# Patient Record
Sex: MALE | Race: BLACK OR AFRICAN AMERICAN | URBAN - METROPOLITAN AREA
[De-identification: names, ages, dates, MRNs, and addresses within clinical notes are randomized per-mention and may not be internally consistent; named-entity substitution may affect disease eponyms.]

---

## 2017-03-21 PROBLEM — 197091007 DIVERTICULAR DISEASE OF BOTH SMALL AND LARGE INTESTINE WITHOUT PERFORATION OR ABSCESS: Status: ACTIVE | Noted: 2017-03-21

## 2021-06-14 ENCOUNTER — TELEPHONE ENCOUNTER (OUTPATIENT)
Dept: URBAN - METROPOLITAN AREA CLINIC 35 | Facility: CLINIC | Age: 51
End: 2021-06-14

## 2022-01-14 ENCOUNTER — TELEPHONE ENCOUNTER (OUTPATIENT)
Dept: URBAN - METROPOLITAN AREA CLINIC 36 | Facility: CLINIC | Age: 52
End: 2022-01-14

## 2022-01-18 ENCOUNTER — OFFICE VISIT (OUTPATIENT)
Dept: URBAN - METROPOLITAN AREA CLINIC 31 | Facility: CLINIC | Age: 52
End: 2022-01-18

## 2022-01-18 RX ORDER — NYSTATIN AND TRIAMCINOLONE ACETONIDE 100000; 1 [USP'U]/G; MG/G
1 APPLICATION TO AFFECTED AREA CREAM TOPICAL TWICE A DAY
Status: ACTIVE | COMMUNITY

## 2022-01-18 RX ORDER — THIAMINE HCL 100 MG
1 CAPSULE WITH A MEAL TABLET ORAL ONCE A DAY
Status: ACTIVE | COMMUNITY

## 2022-01-18 RX ORDER — VITAMIN D 25 MCG
1 TABLET TAB ORAL ONCE A DAY
Status: ACTIVE | COMMUNITY

## 2022-01-18 NOTE — HPI-ABDOMINAL PAIN
Patient presents today for a consultation of abdominal pain with onset?   Located ____ and is described as a ___.  Pain started __ and the patient states that they have ___ episodes per __.  Patient denies/admits any aggravating factors: ___.    Denies/admits any alleviating factors: ____.  Patient has tried ______ (medications) with __ relief of their symptoms.  Patient denies/admits the use of antibiotics within the last (3-6) months. Patient states that they have/have not had previous labs, radiology, or procedures.  The patient denies/admits any hospital/ER visits for their pain. 	  Denies/Admits a family history of colon cancer or diseases/esophageal or gastric cancer or diseases.  Admits having a Colonoscopy in (2017) noting repeat in 5 years. Admits/denies having an EGD (when, by who, and results).

## 2022-06-29 ENCOUNTER — OFFICE VISIT (OUTPATIENT)
Dept: URBAN - METROPOLITAN AREA CLINIC 35 | Facility: CLINIC | Age: 52
End: 2022-06-29
Payer: COMMERCIAL

## 2022-06-29 VITALS
SYSTOLIC BLOOD PRESSURE: 122 MMHG | DIASTOLIC BLOOD PRESSURE: 90 MMHG | OXYGEN SATURATION: 99 % | WEIGHT: 152 LBS | HEIGHT: 68 IN | BODY MASS INDEX: 23.04 KG/M2 | HEART RATE: 60 BPM

## 2022-06-29 DIAGNOSIS — R10.11 RIGHT UPPER QUADRANT ABDOMINAL PAIN: ICD-10-CM

## 2022-06-29 DIAGNOSIS — R14.0 ABDOMINAL BLOATING: ICD-10-CM

## 2022-06-29 DIAGNOSIS — R14.3 FLATULENCE SYMPTOM: ICD-10-CM

## 2022-06-29 DIAGNOSIS — R10.10 PAIN OF UPPER ABDOMEN: ICD-10-CM

## 2022-06-29 PROCEDURE — 99204 OFFICE O/P NEW MOD 45 MIN: CPT | Performed by: PHYSICIAN ASSISTANT

## 2022-06-29 RX ORDER — VITAMIN D 25 MCG
1 TABLET TAB ORAL ONCE A DAY
Status: ACTIVE | COMMUNITY

## 2022-06-29 NOTE — HPI-ABDOMINAL PAIN
52 y/o male patient presents today with abdominal pain, which is located in the upper side which sometimes radiates to the back of the spine.  Pain primarily in RUQ. The duration of symptoms is intermittent. The course of symptoms fluctuates in intensification. The degree of symptoms is moderate to severe.  He notes issues for the past 12 years, and was diagnosed with gastritis in 2014.  Typically the irritation he would experience would take a while to bulid up but now symptoms occur more rapidly.  He states fatty foods do not bother him.  No N/V.  Alcohol, bread, wheat products, caffeine, sugary products and many others does been affecting him and lately he has been much more sensitive to these. Patient did try taking Pepto-Bismol. Patient is currently on an elimination diet.   The relieving factor is none. Patient admits to 1 formed bowel movement per day, with no mucus, melena, or blood in stool.  Colonoscopy in 2017.  Patient admits bloating/gas, indigestion, but denies  changes in bowel habits. Patient admits to associated epigastric pain, but denies nausea, vomiting, fever, chills, dizziness,  dysphagia, globus, sour eructations, early satiety, changes in appetite, or coughing.   Risk factors consist of not DM, not recent antibiotic use, not infectious exposure, no recent travel, not contaminated food and water, and not Crohn's disease.  Patient admits past colonoscopy and EGD in the past.  Patient denies any family history of colonic cancer/disease/polyps.

## 2022-07-05 ENCOUNTER — TELEPHONE ENCOUNTER (OUTPATIENT)
Dept: URBAN - METROPOLITAN AREA CLINIC 6 | Facility: CLINIC | Age: 52
End: 2022-07-05

## 2022-07-29 ENCOUNTER — OFFICE VISIT (OUTPATIENT)
Dept: URBAN - METROPOLITAN AREA SURGERY CENTER 8 | Facility: SURGERY CENTER | Age: 52
End: 2022-07-29

## 2022-08-03 ENCOUNTER — OFFICE VISIT (OUTPATIENT)
Dept: URBAN - METROPOLITAN AREA SURGERY CENTER 8 | Facility: SURGERY CENTER | Age: 52
End: 2022-08-03
Payer: COMMERCIAL

## 2022-08-03 ENCOUNTER — CLAIMS CREATED FROM THE CLAIM WINDOW (OUTPATIENT)
Dept: URBAN - METROPOLITAN AREA CLINIC 4 | Facility: CLINIC | Age: 52
End: 2022-08-03
Payer: COMMERCIAL

## 2022-08-03 DIAGNOSIS — K31.A0 GASTRIC INTESTINAL METAPLASIA, UNSPECIFIED: ICD-10-CM

## 2022-08-03 DIAGNOSIS — K31.89 OTHER DISEASES OF STOMACH AND DUODENUM: ICD-10-CM

## 2022-08-03 DIAGNOSIS — R10.11 ABDOMINAL BURNING SENSATION IN RIGHT UPPER QUADRANT: ICD-10-CM

## 2022-08-03 DIAGNOSIS — K29.60 ADENOPAPILLOMATOSIS GASTRICA: ICD-10-CM

## 2022-08-03 DIAGNOSIS — K31.89 ACQUIRED DEFORMITY OF DUODENUM: ICD-10-CM

## 2022-08-03 DIAGNOSIS — K21.9 ACID REFLUX: ICD-10-CM

## 2022-08-03 DIAGNOSIS — K21.9 GASTRO-ESOPHAGEAL REFLUX DISEASE WITHOUT ESOPHAGITIS: ICD-10-CM

## 2022-08-03 PROCEDURE — 43239 EGD BIOPSY SINGLE/MULTIPLE: CPT | Performed by: INTERNAL MEDICINE

## 2022-08-03 PROCEDURE — 88312 SPECIAL STAINS GROUP 1: CPT | Performed by: PATHOLOGY

## 2022-08-03 PROCEDURE — 88305 TISSUE EXAM BY PATHOLOGIST: CPT | Performed by: PATHOLOGY

## 2022-08-03 PROCEDURE — 88342 IMHCHEM/IMCYTCHM 1ST ANTB: CPT | Performed by: PATHOLOGY

## 2022-08-03 PROCEDURE — G8907 PT DOC NO EVENTS ON DISCHARG: HCPCS | Performed by: INTERNAL MEDICINE

## 2022-08-03 RX ORDER — VITAMIN D 25 MCG
1 TABLET TAB ORAL ONCE A DAY
Status: ACTIVE | COMMUNITY

## 2022-08-22 ENCOUNTER — OFFICE VISIT (OUTPATIENT)
Dept: URBAN - METROPOLITAN AREA CLINIC 35 | Facility: CLINIC | Age: 52
End: 2022-08-22
Payer: COMMERCIAL

## 2022-08-22 VITALS — WEIGHT: 155 LBS | BODY MASS INDEX: 23.49 KG/M2 | HEIGHT: 68 IN

## 2022-08-22 DIAGNOSIS — R10.10 PAIN OF UPPER ABDOMEN: ICD-10-CM

## 2022-08-22 DIAGNOSIS — R14.3 FLATULENCE SYMPTOM: ICD-10-CM

## 2022-08-22 DIAGNOSIS — R14.0 ABDOMINAL BLOATING: ICD-10-CM

## 2022-08-22 DIAGNOSIS — K44.9 HIATAL HERNIA: ICD-10-CM

## 2022-08-22 DIAGNOSIS — R10.11 RIGHT UPPER QUADRANT ABDOMINAL PAIN: ICD-10-CM

## 2022-08-22 DIAGNOSIS — R68.89 ERYTHEMATOUS MUCOSA: ICD-10-CM

## 2022-08-22 PROBLEM — 301717006: Status: ACTIVE | Noted: 2022-08-22

## 2022-08-22 PROBLEM — 162432007 SYMPTOM: GENERALIZED: Status: ACTIVE | Noted: 2022-08-19

## 2022-08-22 PROBLEM — 83132003: Status: ACTIVE | Noted: 2022-08-22

## 2022-08-22 PROBLEM — 84089009 HIATAL HERNIA: Status: ACTIVE | Noted: 2022-08-19

## 2022-08-22 PROBLEM — 116289008: Status: ACTIVE | Noted: 2022-08-22

## 2022-08-22 PROBLEM — 308698004: Status: ACTIVE | Noted: 2022-08-22

## 2022-08-22 PROCEDURE — 99213 OFFICE O/P EST LOW 20 MIN: CPT | Performed by: INTERNAL MEDICINE

## 2022-08-22 RX ORDER — VITAMIN D 25 MCG
1 TABLET TAB ORAL ONCE A DAY
Status: ACTIVE | COMMUNITY

## 2022-08-22 RX ORDER — MULTIVITAMIN
1 TABLET TABLET ORAL ONCE A DAY
Status: ACTIVE | COMMUNITY

## 2022-08-22 NOTE — HPI-BLOATING/GAS
He denies continued episodes of bloating/gas with dietary modifications. Last visit (6/29/2022)  Patient admits to bloating and gas.

## 2022-08-22 NOTE — HPI-ABDOMINAL PAIN
Patient presents today to review his endoscopy results. He denies any complications after his procedure.   Since his procedure he denies any episodes of dysphagia, globus, a change in appetite or bowel habits.   He denies continued episodes of epigastric pain with dietary modifications.  Currently, he reports 1 bowel movements per day without strain. His stools are formed.  Denies melena, blood, mucus, or melena. He admits/denies any rectal pain or pruritus ani.  Last visit (6/29/2022)  50 y/o male patient presents today with abdominal pain, which is located in the upper side which sometimes radiates to the back of the spine.  Pain primarily in RUQ. The duration of symptoms is intermittent. The course of symptoms fluctuates in intensification. The degree of symptoms is moderate to severe.  He notes issues for the past 12 years, and was diagnosed with gastritis in 2014.  Typically, the irritation he would experience would take a while to build up but now symptoms occur more rapidly.  He states fatty foods do not bother him.  No N/V.  Alcohol, bread, wheat products, caffeine, sugary products and many others does been affecting him and lately he has been much more sensitive to these. Patient did try taking Pepto-Bismol. Patient is currently on an elimination diet.   The relieving factor is none. Patient admits to 1 formed bowel movement per day, with no mucus, melena, or blood in stool.  Colonoscopy in 2017.  Patient admits bloating/gas, indigestion, but denies  changes in bowel habits. Patient admits to associated epigastric pain, but denies nausea, vomiting, fever, chills, dizziness,  dysphagia, globus, sour eructations, early satiety, changes in appetite, or coughing.   Risk factors consist of not DM, not recent antibiotic use, not infectious exposure, no recent travel, not contaminated food and water, and not Crohn's disease.  Patient admits past colonoscopy and EGD in the past.  Patient denies any family history of colonic cancer/disease/polyps.

## 2024-04-11 ENCOUNTER — OV EP (OUTPATIENT)
Dept: URBAN - METROPOLITAN AREA CLINIC 33 | Facility: CLINIC | Age: 54
End: 2024-04-11
Payer: COMMERCIAL

## 2024-04-11 VITALS
SYSTOLIC BLOOD PRESSURE: 120 MMHG | WEIGHT: 153.4 LBS | BODY MASS INDEX: 23.25 KG/M2 | OXYGEN SATURATION: 98 % | HEIGHT: 68 IN | HEART RATE: 74 BPM | DIASTOLIC BLOOD PRESSURE: 84 MMHG

## 2024-04-11 DIAGNOSIS — R10.10 PAIN OF UPPER ABDOMEN: ICD-10-CM

## 2024-04-11 DIAGNOSIS — K44.9 HIATAL HERNIA: ICD-10-CM

## 2024-04-11 DIAGNOSIS — R14.0 ABDOMINAL BLOATING: ICD-10-CM

## 2024-04-11 DIAGNOSIS — R14.3 FLATULENCE SYMPTOM: ICD-10-CM

## 2024-04-11 DIAGNOSIS — R68.89 ERYTHEMATOUS MUCOSA: ICD-10-CM

## 2024-04-11 PROCEDURE — 99213 OFFICE O/P EST LOW 20 MIN: CPT | Performed by: INTERNAL MEDICINE

## 2024-04-11 RX ORDER — MULTIVITAMIN
1 TABLET TABLET ORAL ONCE A DAY
Status: ACTIVE | COMMUNITY

## 2024-04-11 RX ORDER — FAMOTIDINE 10 MG/1
1 TABLET AS NEEDED TABLET ORAL TWICE A DAY
Status: ACTIVE | COMMUNITY

## 2024-04-11 RX ORDER — VITAMIN D 25 MCG
1 TABLET TAB ORAL ONCE A DAY
Status: ACTIVE | COMMUNITY

## 2024-04-11 NOTE — HPI-ABDOMINAL PAIN
Patient presents today for recent symptoms of abdominal pain. Onset strarted mid January 2024. Patient states the pain is located in the epigastric region and describes the pain as an "hunger pain" . The pain is worse on an empty stomach and before a bowel movement.  He states he has tried Famotidine 20mg prn without any relief of his symptoms. Patient mentioned he is not sure if the Famotidine is helping with his symptoms. Patient states his symptoms is not present all the time, but occurs when consuming meals. He denies any associated symptoms of nausea w/vomiting, indigestion, heartburn, or excessive belching. Patient denies having any recent labs, imaging, or ER visit. His symptom are better after eating salads   Last visit (08/22/2022) Patient presents today to review his endoscopy results. He denies any complications after his procedure.   Since his procedure he denies any episodes of dysphagia, globus, a change in appetite or bowel habits.   He denies continued episodes of epigastric pain with dietary modifications.  Currently, he reports 1 bowel movements per day without strain. His stools are formed.  Denies melena, blood, mucus, or melena. He denies any rectal pain or pruritus ani.  Last visit (6/29/2022)  52 y/o male patient presents today with abdominal pain, which is located in the upper side which sometimes radiates to the back of the spine.  Pain primarily in RUQ. The duration of symptoms is intermittent. The course of symptoms fluctuates in intensification. The degree of symptoms is moderate to severe.  He notes issues for the past 12 years, and was diagnosed with gastritis in 2014.  Typically, the irritation he would experience would take a while to build upbut now symptoms occur more rapidly.  He states fatty foods do not bother him.  No N/V.  Alcohol, bread, wheat products, caffeine, sugary products and many others has been affecting himand lately he has been much more sensitive to these. Patient did try taking Pepto-Bismol. Patient is currently on an elimination diet.   The relieving factor is none. Patient admits to 1 formed bowel movement per day, with no mucus, melena, or blood in stool.  Colonoscopy in 2017.  Patient admits bloating/gas, indigestion, but denies  changes in bowel habits. Patient admits to associated epigastric pain, but denies nausea, vomiting, fever, chills, dizziness,  dysphagia, globus, sour eructations, early satiety, changes in appetite, or coughing.   Risk factors consist of not DM, not recent antibiotic use, not infectious exposure, no recent travel, not contaminated food and water, and not Crohn's disease.  Patient admits past colonoscopy and EGD in the past.  Patient denies any family history of colonic cancer/disease/polyps.

## 2024-04-18 ENCOUNTER — EGD (OUTPATIENT)
Dept: URBAN - METROPOLITAN AREA SURGERY CENTER 8 | Facility: SURGERY CENTER | Age: 54
End: 2024-04-18
Payer: COMMERCIAL

## 2024-04-18 ENCOUNTER — LAB (OUTPATIENT)
Dept: URBAN - METROPOLITAN AREA CLINIC 4 | Facility: CLINIC | Age: 54
End: 2024-04-18
Payer: COMMERCIAL

## 2024-04-18 DIAGNOSIS — K29.60 OTHER GASTRITIS WITHOUT BLEEDING: ICD-10-CM

## 2024-04-18 DIAGNOSIS — K29.70 GASTRITIS, UNSPECIFIED, WITHOUT BLEEDING: ICD-10-CM

## 2024-04-18 DIAGNOSIS — R10.11 ABDOMINAL BURNING SENSATION IN RIGHT UPPER QUADRANT: ICD-10-CM

## 2024-04-18 DIAGNOSIS — R10.12 ABDOMINAL BURNING SENSATION IN LEFT UPPER QUADRANT: ICD-10-CM

## 2024-04-18 DIAGNOSIS — K31.89 OTHER DISEASES OF STOMACH AND DUODENUM: ICD-10-CM

## 2024-04-18 PROCEDURE — 88305 TISSUE EXAM BY PATHOLOGIST: CPT | Performed by: PATHOLOGY

## 2024-04-18 PROCEDURE — 43239 EGD BIOPSY SINGLE/MULTIPLE: CPT | Performed by: INTERNAL MEDICINE

## 2024-04-18 PROCEDURE — 88342 IMHCHEM/IMCYTCHM 1ST ANTB: CPT | Performed by: PATHOLOGY

## 2024-04-18 RX ORDER — FAMOTIDINE 10 MG/1
1 TABLET AS NEEDED TABLET ORAL TWICE A DAY
Status: ACTIVE | COMMUNITY

## 2024-04-18 RX ORDER — VITAMIN D 25 MCG
1 TABLET TAB ORAL ONCE A DAY
Status: ACTIVE | COMMUNITY

## 2024-04-18 RX ORDER — MULTIVITAMIN
1 TABLET TABLET ORAL ONCE A DAY
Status: ACTIVE | COMMUNITY

## 2024-05-03 ENCOUNTER — LAB OUTSIDE AN ENCOUNTER (OUTPATIENT)
Dept: URBAN - METROPOLITAN AREA CLINIC 35 | Facility: CLINIC | Age: 54
End: 2024-05-03

## 2024-05-09 ENCOUNTER — OFFICE VISIT (OUTPATIENT)
Dept: URBAN - METROPOLITAN AREA CLINIC 33 | Facility: CLINIC | Age: 54
End: 2024-05-09
Payer: COMMERCIAL

## 2024-05-09 ENCOUNTER — DASHBOARD ENCOUNTERS (OUTPATIENT)
Age: 54
End: 2024-05-09

## 2024-05-09 VITALS
WEIGHT: 153.2 LBS | BODY MASS INDEX: 23.22 KG/M2 | HEART RATE: 55 BPM | OXYGEN SATURATION: 99 % | SYSTOLIC BLOOD PRESSURE: 120 MMHG | DIASTOLIC BLOOD PRESSURE: 82 MMHG | HEIGHT: 68 IN

## 2024-05-09 DIAGNOSIS — R10.10 PAIN OF UPPER ABDOMEN: ICD-10-CM

## 2024-05-09 DIAGNOSIS — K57.50 DIVERTICULOSIS OF BOTH SMALL AND LARGE INTESTINE WITHOUT PERFORATION OR ABSCESS WITHOUT BLEEDING: ICD-10-CM

## 2024-05-09 PROCEDURE — 99213 OFFICE O/P EST LOW 20 MIN: CPT | Performed by: INTERNAL MEDICINE

## 2024-05-09 RX ORDER — MULTIVITAMIN
1 TABLET TABLET ORAL ONCE A DAY
Status: ACTIVE | COMMUNITY

## 2024-05-09 RX ORDER — VITAMIN D 25 MCG
1 TABLET TAB ORAL ONCE A DAY
Status: ACTIVE | COMMUNITY

## 2024-05-09 RX ORDER — FAMOTIDINE 10 MG/1
1 TABLET AS NEEDED TABLET ORAL TWICE A DAY
Status: ON HOLD | COMMUNITY